# Patient Record
Sex: FEMALE | Race: WHITE | NOT HISPANIC OR LATINO | URBAN - METROPOLITAN AREA
[De-identification: names, ages, dates, MRNs, and addresses within clinical notes are randomized per-mention and may not be internally consistent; named-entity substitution may affect disease eponyms.]

---

## 2024-10-18 DIAGNOSIS — Z00.00 UNSPECIFIED GENERAL MEDICAL EXAMINATION: Primary | ICD-10-CM

## 2025-07-22 ENCOUNTER — TELEPHONE (OUTPATIENT)
Dept: SURGERY | Facility: CLINIC | Age: 50
End: 2025-07-22
Payer: MEDICAID

## 2025-07-22 NOTE — LETTER
Bariatric Office  [address] [place] [address], [address]  [place] [address]  Phone: [phone] - Fax: [phone]    Referral Response       Date: 2025     Sender: Lynette Stein MA, Bariatric Patient Navigator    Doctor: Gino Muñoz NP    Patient:  Alejandra Flores     : 1975      Candidacy:   [] Patient meets criteria for Weight Loss Surgery    [x] Does NOT meet criteria for Weight Loss Surgery  [] No qualifying co-morbidity    [] Below age 25  [] BMI > 55     [] BMI too low  [] Uncontrolled DM (Hgb A1C >7)   [] Documented Non-compliance  [x] Currently vaping or smoking: Please re-refer once pt has stopped smoking for 6 weeks with a negative urine nicotine screening. NOTE: Please consider referring patient to the [facility] by contacting the Smoking Cessation Program at [phone]. More information can be found at [email or web address].      [] We need more information to determine if patient meets criteria  [] Last office visit note  [] Height and Weight  [] Sleep Study  [] Recent Hgb A1C          [] Other:     [] Insurance requires medical records for review   [] 5 years of progress notes from PCP  [] 2-year weight history  [x] Please notify patient   [] Patient notified of above   ------------------------------------------------------------------------------------------  Please call our office if you have any questions or need additional information besides what is provided above. Thank you!  The information contained in this facsimile message is privileged and confidential information intended for the use of the recipient listed above. If the reader of this message is not the intended recipient, you are hereby notified that any dissemination, distribution, or copying of this communication is strictly prohibited. If you have received this fax in error, please notify us by telephone to arrange for return of the documents to us.